# Patient Record
Sex: FEMALE | Race: WHITE
[De-identification: names, ages, dates, MRNs, and addresses within clinical notes are randomized per-mention and may not be internally consistent; named-entity substitution may affect disease eponyms.]

---

## 2018-09-26 ENCOUNTER — HOSPITAL ENCOUNTER (EMERGENCY)
Dept: HOSPITAL 25 - ED | Age: 78
Discharge: LEFT BEFORE DISCHARGE | End: 2018-09-26
Payer: MEDICARE

## 2018-09-26 VITALS — DIASTOLIC BLOOD PRESSURE: 74 MMHG | SYSTOLIC BLOOD PRESSURE: 124 MMHG

## 2018-09-26 DIAGNOSIS — J98.01: ICD-10-CM

## 2018-09-26 DIAGNOSIS — R09.02: ICD-10-CM

## 2018-09-26 DIAGNOSIS — R06.02: ICD-10-CM

## 2018-09-26 DIAGNOSIS — R07.9: Primary | ICD-10-CM

## 2018-09-26 LAB
BASOPHILS # BLD AUTO: 0 10^3/UL (ref 0–0.2)
EOSINOPHIL # BLD AUTO: 0.1 10^3/UL (ref 0–0.6)
HCT VFR BLD AUTO: 43 % (ref 35–47)
HGB BLD-MCNC: 14.3 G/DL (ref 12–16)
INR PPP/BLD: 0.93 (ref 0.77–1.02)
LYMPHOCYTES # BLD AUTO: 0.5 10^3/UL (ref 1–4.8)
MCH RBC QN AUTO: 29 PG (ref 27–31)
MCHC RBC AUTO-ENTMCNC: 33 G/DL (ref 31–36)
MCV RBC AUTO: 87 FL (ref 80–97)
MONOCYTES # BLD AUTO: 0.7 10^3/UL (ref 0–0.8)
NEUTROPHILS # BLD AUTO: 10.4 10^3/UL (ref 1.5–7.7)
NRBC # BLD AUTO: 0 10^3/UL
NRBC BLD QL AUTO: 0
PLATELET # BLD AUTO: 185 10^3/UL (ref 150–450)
RBC # BLD AUTO: 4.95 10^6/UL (ref 4–5.4)
RBC UR QL AUTO: (no result)
WBC # BLD AUTO: 11.8 10^3/UL (ref 3.5–10.8)
WBC UR QL AUTO: (no result)

## 2018-09-26 PROCEDURE — 85730 THROMBOPLASTIN TIME PARTIAL: CPT

## 2018-09-26 PROCEDURE — 99283 EMERGENCY DEPT VISIT LOW MDM: CPT

## 2018-09-26 PROCEDURE — 36415 COLL VENOUS BLD VENIPUNCTURE: CPT

## 2018-09-26 PROCEDURE — 71045 X-RAY EXAM CHEST 1 VIEW: CPT

## 2018-09-26 PROCEDURE — 85610 PROTHROMBIN TIME: CPT

## 2018-09-26 PROCEDURE — 96374 THER/PROPH/DIAG INJ IV PUSH: CPT

## 2018-09-26 PROCEDURE — 81003 URINALYSIS AUTO W/O SCOPE: CPT

## 2018-09-26 PROCEDURE — 84484 ASSAY OF TROPONIN QUANT: CPT

## 2018-09-26 PROCEDURE — 80053 COMPREHEN METABOLIC PANEL: CPT

## 2018-09-26 PROCEDURE — 87086 URINE CULTURE/COLONY COUNT: CPT

## 2018-09-26 PROCEDURE — 82803 BLOOD GASES ANY COMBINATION: CPT

## 2018-09-26 PROCEDURE — 93005 ELECTROCARDIOGRAM TRACING: CPT

## 2018-09-26 PROCEDURE — 83605 ASSAY OF LACTIC ACID: CPT

## 2018-09-26 PROCEDURE — 81015 MICROSCOPIC EXAM OF URINE: CPT

## 2018-09-26 PROCEDURE — 83880 ASSAY OF NATRIURETIC PEPTIDE: CPT

## 2018-09-26 PROCEDURE — 85025 COMPLETE CBC W/AUTO DIFF WBC: CPT

## 2018-09-26 NOTE — ED
HPI Cardiac





- HPI Summary


HPI Summary: 


This patient is an 78 year old F BIBA to ED with a chief complaint of CP and 

SOB since yesterday evening. She reports she couldnt sleep due to the SOB. The 

CC is described as worsened today. Prior treatment includes 324 ASA and 2 

Duonebs given by EMS. The patient now feels much better. The patient rates the 

pain 2/10 in severity currently. Symptoms aggravated by ambulation. Symptoms 

alleviated by nothing. EMS reports the patient was wheezing upon arrival. 

Patient denies bilateral LE swelling. The patient went for a stress test in May 

2018 done by Dr. Parry, but couldnt finish because she had LOC after 5 

minutes.





- History of Current Complaint


Chief Complaint: EDShortnessOfBreath


Stated Complaint: CHEST PAIN/RESP ISSUES


Time Seen by Provider: 09/26/18 08:28


Hx Obtained From: Patient


Onset/Duration: Started Days Ago - since yesterday night, Resolved - feels 

better than PTA


Timing: Constant


Current Severity: Mild


Pain Intensity: 2


Pain Scale Used: 0-10 Numeric


Aggravating Factor(s): Exertion - ambulation


Alleviating Factor(s): Nothing


Associated Signs and Symptoms: Positive: Other: - CP and SOB; EMS reports the 

patient was wheezing upon arrival. Patient denies bilateral LE swelling.





- Allergy/Home Medications


Allergies/Adverse Reactions: 


 Allergies











Allergy/AdvReac Type Severity Reaction Status Date / Time


 


No Known Allergies Allergy   Verified 09/26/18 08:33











Home Medications: 


 Home Medications





Lisinopril/HCTZ 10/12.5(NF) [Zestoretic 10/12.5(NF)] 1 tab PO BID 09/26/18 [

History Confirmed 09/26/18]











PMH/Surg Hx/FS Hx/Imm Hx


Cardiovascular History: Reports: Hx Hypertension


Respiratory History: Reports: Hx Asthma





- Cancer History


Cancer Type, Location and Year: colon cancer





- Surgical History


Surgery Procedure, Year, and Place: tumor in colon removed





- Immunization History


Date of Tetanus Vaccine: UNK


Date of Influenza Vaccine: Never


Infectious Disease History: No


Infectious Disease History: 


   Denies: Traveled Outside the [place] in Last 30 Days





- Family History


Known Family History: Positive: Cardiac Disease, Other


Family History: TB, MS





- Social History


Alcohol Use: None


Substance Use Type: Reports: None


Smoking Status (MU): Never Smoked Tobacco





Review of Systems


Positive: Chest Pain


Positive: Shortness Of Breath, Other - wheezing per EMS


Positive: Other - denies bilateral LE swelling


All Other Systems Reviewed And Are Negative: Yes





Physical Exam





- Summary


Physical Exam Summary: 


Appearance: Well appearing, no pain distress


Skin: warm, dry, reflects adequate perfusion


Head/face: normal


Eyes: EOMI, IKER


ENT: normal      


Neck: supple, non-tender


Respiratory: Breath sounds present, Occasional wheezing bilaterally


Cardiovascular: RRR, pulses symmetrical 


Abdomen: non-tender, soft


Bowel: present   


Musculoskeletal: normal, strength/ROM intact


Neuro: normal, sensory motor intact, A&Ox3


Triage Information Reviewed: Yes


Vital Signs On Initial Exam: 


 Initial Vitals











Temp Pulse Resp BP Pulse Ox


 


 99.4 F   100   20   119/81   94 


 


 09/26/18 08:25  09/26/18 08:25  09/26/18 08:25  09/26/18 08:25  09/26/18 08:25











Vital Signs Reviewed: Yes





Diagnostics





- Vital Signs


 Vital Signs











  Temp Pulse Resp BP Pulse Ox


 


 09/26/18 08:25  99.4 F  100  20  119/81  94














- Laboratory


Result Diagrams: 


 09/26/18 08:54





 09/26/18 08:53


Lab Statement: Any lab studies that have been ordered have been reviewed, and 

results considered in the medical decision making process.





- Radiology


  ** CXR


Radiology Interpretation Completed By: Radiologist - HYPERINFLATION. NO ACTIVE 

CARDIOPULMONARY DISEASE. ED physician has reviewed this radiology report.





- EKG


  ** 0824


Cardiac Rate: Tachycardia - 101 BPM


EKG Rhythm: Sinus Tachycardia


EKG Interpretation: No acute changes





  ** 1126


Cardiac Rate: NL - 92 BPM


EKG Rhythm: Sinus Rhythm


EKG Interpretation: PVCs





Re-Evaluation





- Re-Evaluation


  ** First Eval


Re-Evaluation Time: 11:57


Comment: Patient is doing fine. Discussed consult with Dr. Miller and results 

of labs and CXR. Discussed plan for admission. Patient understands and agrees.





Disposition





- Course


Assessment/Plan: This patient is an 78 year old F BIBA to ED with a chief 

complaint of CP and SOB since yesterday evening. CXR reveals HYPERINFLATION. NO 

ACTIVE CARDIOPULMONARY DISEASE. EKG done at 0824 shows sinus tachy at 101 BPM 

and no acute changes. Repeat EKG done at 1126 shows NSR at 92 BPM and PVCs. In 

the ED course, the patient was given duoneb, ASA, and solu-medrol. Blood work/

UA obtained. The patient will be admitted to Dr. Miller with dx of CP, 

bronchospasm, hypoxia, and r/o MI. Patient understands and agrees.





- Differential Dx - Cardiopulmonary


Differential Diagnoses - Cardiopulmonary: Asthma, CAD, Chest Wall Pain, Hypoxia

, Other - chest pain, bronchospasms, r/o MI





- Diagnoses


Provider Diagnoses: 


 Ruled out for myocardial infarction, Chest pain, Bronchospasm, Hypoxia








- Physician Notifications


Discussed Care Of Patient With: Win Miller


Time Discussed With Above Provider: 10:44


Instructed by Provider To: Other - Consulted Dr. Miller who doesn't want to 

admit the patient. Consulted Dr. Miller at 1138 about the patient who will see 

the patient in the ED. Dr. Miller accepts the patient for admission at 1149.





Discharge





- Sign-Out/Discharge


Documenting (check all that apply): Patient Departure - admit





- Discharge Plan


Condition: Stable


Disposition: ADMITTED TO [facility]


Referrals: 


Edgardo Shannon MD [Primary Care Provider] - 





- Billing Disposition and Condition


Condition: STABLE


Disposition: Admitted to [facility]





- Attestation Statements


Document Initiated by Scribe: Yes


Documenting Scribe: Kevyn Vivar


Provider For Whom Scribe is Documenting (Include Credential): Chano Noland MD


Scribe Attestation: 


Kevyn HERR, scribed for Chano Noland MD on 09/26/18 at 1225. 


Scribe Documentation Reviewed: Yes


Provider Attestation: 


The documentation as recorded by the colleenibKevyn washington accurately reflects the 

service I personally performed and the decisions made by Chano soriano MD

## 2018-09-26 NOTE — RAD
HISTORY: sob



COMPARISONS: February 17, 2010 



VIEWS: 1: frontal AP view of the chest at 8:50 AM 



FINDINGS:

LINES AND TUBES: None.

CARDIOMEDIASTINAL SILHOUETTE: The cardiomediastinal silhouette is normal for portable

technique.

PLEURA: The costophrenic angles are sharp. No pleural abnormalities are noted.

LUNG PARENCHYMA: There is hyperinflation.

ABDOMEN: The upper abdomen is clear. There is no subphrenic gas.

BONES AND SOFT TISSUES: No bone or soft tissue abnormalities are noted.



IMPRESSION: HYPERINFLATION. NO ACTIVE CARDIOPULMONARY DISEASE.

## 2018-09-27 NOTE — CONS
CC:  Dr. Parry; Dr. Shannon *

 

MEDICAL CONSULTATION:

 

DATE OF CONSULT:  09/26/18 - EMERGENCY DEPT.

 

Dr. Chano Noland, asked me to see this patient and I examined her on 09/26/
18.  She is a pleasant 78-year-old woman.  She came because of shortness of 
breath and chest pain.  She says she slept poorly all night.  She was in bed, 
she has a preparation that she gets on the internet, a combination of valerian 
root, phenobarbital, and peppermint.  She takes this about twice a day.  She 
has taken it twice a day most days for years.  Sometimes for nervousness, 
sometimes for palpitation or anxiety, sometimes for chest pain.  She states 
that in the spring she seemed to be having more chest pain.  Dr. Parry did a 
treadmill stress test on May 17th, the heart rate response was inadequate, only 
tariq to 100.  Her exercise tolerance was poor.  The stress test was considered 
nondiagnostic.  She has an appointment to see Dr. Shannon on 10/11/18.

 

The patient takes 2 tablets of lisinopril/hydrochlorothiazide every morning, no 
other medication. She does not use oxygen or inhalers.  She has never smoked.

 

She lives with her son, who would be her surrogate decision maker.  He works at 
night and he was sleeping at home; at that time she called 911, she thinks he 
is probably still asleep.

 

The pain has stopped now.  There was no radiation or other associated symptoms.

 

MEDICATIONS ON DISCHARGE:

1.  Lisinopril/hydrochlorothiazide as prescribed by Dr. Shannon.

2.  Liquid phenobarbital with valerian root and peppermint oil.

 

ALLERGIES:  None known.

 

FAMILY HISTORY:  Unremarkable.

 

REVIEW OF SYSTEMS:  12-point review of systems other than pertinent positives 
above was unremarkable.

 

PHYSICAL EXAM:  Blood pressure 135/66, oxygen saturation 91% on room air, on 
the blood gas it was 93%, it was 87% with ambulation.

 

She is a pleasant, elderly woman, lying supine on the emergency room stretcher. 
She is somewhat anxious but otherwise appears comfortable.  HEENT:  There are 
no signs of head trauma.  Head is normocephalic.  Pupils are equal, round, and 
reactive to light.  Extraocular muscles intact.  Mouth and pharynx are clear.  
Neck was supple.  There was no JVD, adenopathy, or thyromegaly.  Lungs were 
clear to auscultation and percussion.  Heart was regular without murmurs or 
rubs.  There was no chest wall tenderness.  There is no axillary or cervical 
adenopathy.  Abdomen was soft, bowel sounds were normal, no masses or 
organomegaly.  There was no pedal edema.  No calf tenderness.  Skin was warm, 
dry, and intact.  Neurologic:  She moved all limbs well.  Speech was clear and 
distinct.  She was oriented and seemed to have good memory.

 

The patient's problems appear rather chronic, she says at least since the spring
, may be quite a bit longer.  I will discuss the case with Dr. Shannon.  The 
patient had already been offered a chemical stress test and refused.  I 
discussed this with her again and she again refused to have a more extensive 
stress test than which she has already had.  I note she had two EKGs here 3 
hours apart and two troponins also 3 hours apart, all of which were 
unremarkable.

 

 [id number]/CPS #: [id number]

ALEK